# Patient Record
(demographics unavailable — no encounter records)

---

## 2024-11-06 NOTE — ASSESSMENT
[FreeTextEntry1] : 67 year old female with chronic GERD for over 2 years. Symptoms slightly worse over the last 2-3 weeks with c/o increased bloating and nausea at night. Will use Omeprazole 40 mg QD X 3 weeks, Then will consider switching back to pepcid when symptoms resolve  Will schedule upper endoscopy for further evaluation. I have discussed the indications (including but not limited to ruling out Garcia's esophagus, AVM's, H. pylori, and malignancies), benefits, risks (including but not limited to reaction to the anesthesia, infection, bleeding, missed lesions, and perforation), and alternatives to an endoscopy. The patient understands all options and has agreed to endoscopy and is medically optimized for the planned procedure.   Elevated LFTS. Will order full hepatic work up including labs and abdominal US for further evaluation. Most likely due to fatty liver or metabolic syndrome.   Telephonic visit in 3 weeks   I, Dr. uSsan Vee was present during the history and visit Patient with worsening GERD Agree with short course of PPI, then consider switching back to the H2 blocker.  Will we discussed this in 3-week agree with EGD. Mildly elevated LFTs-will order liver serologies and ultrasound Abdominal exam positive bowel sounds soft nontender

## 2024-11-06 NOTE — REASON FOR VISIT
[Follow-up] : a follow-up of an existing diagnosis [FreeTextEntry1] : GERD, nausea, bloating, elevated LFTs

## 2024-11-06 NOTE — ADDENDUM
[FreeTextEntry1] : I, Gabriella Chapa NP, acted as scribe for Dr. Susan Vee for this patient encounter

## 2024-11-06 NOTE — HISTORY OF PRESENT ILLNESS
[FreeTextEntry1] : LINDSAY BAUTISTA is a 67 year old female with PMH of breast cancer, HTN, HLD, presenting today for a follow up visit. Currently taking Famotidine 40 mg QHS for heartburn and epigastric pain. For the last 2-3 weeks she has been having nausea and bloating during the night. She denies any vomiting, dysphagia, weight loss. No dietary changes or new medications. She tries to follow a GERD diet and eat cleanly. No prior endoscopy.   She reports mildly elevated LFTs with her most recent blood work. AST 49, ALT 57, ALK Phos 132. She denies abdominal pain, jaundice, scleral icterus, pruritus. No known history of liver disease. She does not drink alcohol or use illicit drugs. No history of blood transfusion. She does have one tattoo that is roughly 20 years old. No recent abdominal US for review.

## 2024-12-19 NOTE — ASSESSMENT
[FreeTextEntry1] : A/P gerd , bloating, nausea   I discussed the risks and benefits of EGD and patient was given opportunity to ask questions. EGD to r/o Garcia's  esophagus, PUD, mass, AVM'S. Pt is medically optimized for EGD

## 2025-03-20 NOTE — REVIEW OF SYSTEMS
[Heartburn] : heartburn [Bloating (gassiness)] : bloating [Negative] : Psychiatric [FreeTextEntry7] : Nausea

## 2025-03-20 NOTE — HISTORY OF PRESENT ILLNESS
[FreeTextEntry1] : Patient with history hypertension GERD hypertension  Patient with a history of GERD nausea bloating.  Nausea is better with PPI Prilosec 40 mg and Pepcid 40 mg nightly.  However  appears to get bloating which is worsening at night.  EGD in December 2024 showed hiatal hernia which was small fundic gland polyps, biopsies negative for HP and celiac  Patient tells me she had hematuria in late fall early winter.  Had workup with urology.  Had 2 cystoscopies.  Workup was essentially negative and hematuria resolved.  I did review CT results from January 2025.  Noted to have gallstones and diverticulosis.  In March 19 LFTs were normal hemoglobin 13.8 in February 2025.

## 2025-03-20 NOTE — ASSESSMENT
[FreeTextEntry1] : A/P Patient with GERD, bloating We discussed the FODMAP diet I renewed her omeprazole 40 mg in the morning and Pepcid nightly. Will consider adding Levsin however patient is hesitant to introduce new medications as she states she frequently has side effects to new medications May try Gas-X as needed Follow-up in 3 to 6-month

## 2025-06-20 NOTE — HISTORY OF PRESENT ILLNESS
[FreeTextEntry1] : Patient with a history of GERD.  Currently she is taking omeprazole 40 mg in the morning and Pepcid 40 mg nightly.  Patient states she gets nausea particularly between 830 and 9:30 PM.  She eats dinner around 5-6 30.  She feels if she has 2 pretzels in the evening her symptoms resolved.  During the day she is asymptomatic.  It is the evening that she gets "queasy" or early a.m.  Lately however her symptoms have been better.  She has not required Gas-X-never felt that Gas-X helped anyway.  Did not require FODMAP diet as her symptoms are much better lately.  EGD in December 2024 negative for H. pylori negative for celiac.  Had a hiatal hernia.   In January 2025 CT showed gallstones and diverticulosis

## 2025-06-20 NOTE — ASSESSMENT
[FreeTextEntry1] : A/P Nausea, bloating, GERD Will continue Prilosec 40 mg a.m., Pepcid 40 mg nightly Today's instructions for acid reflux include avoid provocative foods. For example citrus alcohol coffee chocolate mints. Smaller meals, no eating 3 hours prior to bedtime and elevate head of the bed prior to sleep.  Follow-up in 6 months.   Patient with family history of colon polyp.  Colonoscopy 2022 showed diverticulosis.  Due for colonoscopy 2027